# Patient Record
Sex: FEMALE | Race: WHITE | NOT HISPANIC OR LATINO | Employment: FULL TIME | URBAN - METROPOLITAN AREA
[De-identification: names, ages, dates, MRNs, and addresses within clinical notes are randomized per-mention and may not be internally consistent; named-entity substitution may affect disease eponyms.]

---

## 2021-04-20 ENCOUNTER — HOSPITAL ENCOUNTER (INPATIENT)
Facility: MEDICAL CENTER | Age: 36
LOS: 2 days | DRG: 915 | End: 2021-04-22
Attending: INTERNAL MEDICINE | Admitting: INTERNAL MEDICINE
Payer: COMMERCIAL

## 2021-04-20 ENCOUNTER — HOSPITAL ENCOUNTER (EMERGENCY)
Facility: MEDICAL CENTER | Age: 36
End: 2021-04-20
Attending: INTERNAL MEDICINE | Admitting: INTERNAL MEDICINE

## 2021-04-20 ENCOUNTER — APPOINTMENT (OUTPATIENT)
Dept: RADIOLOGY | Facility: MEDICAL CENTER | Age: 36
DRG: 915 | End: 2021-04-20
Attending: INTERNAL MEDICINE
Payer: COMMERCIAL

## 2021-04-20 PROBLEM — T78.2XXA ANAPHYLACTIC REACTION: Status: ACTIVE | Noted: 2021-04-20

## 2021-04-20 PROBLEM — J96.90 RESPIRATORY FAILURE REQUIRING INTUBATION (HCC): Status: ACTIVE | Noted: 2021-04-20

## 2021-04-20 LAB
ANION GAP SERPL CALC-SCNC: 6 MMOL/L (ref 7–16)
BASE EXCESS BLDA CALC-SCNC: -4 MMOL/L (ref -4–3)
BASOPHILS # BLD AUTO: 0.3 % (ref 0–1.8)
BASOPHILS # BLD: 0.02 K/UL (ref 0–0.12)
BODY TEMPERATURE: ABNORMAL DEGREES
BREATHS SETTING VENT: 16
BUN SERPL-MCNC: 6 MG/DL (ref 8–22)
CALCIUM SERPL-MCNC: 8.2 MG/DL (ref 8.5–10.5)
CHLORIDE SERPL-SCNC: 104 MMOL/L (ref 96–112)
CK SERPL-CCNC: 175 U/L (ref 0–154)
CO2 BLDA-SCNC: 21 MMOL/L (ref 20–33)
CO2 SERPL-SCNC: 21 MMOL/L (ref 20–33)
CREAT SERPL-MCNC: 0.68 MG/DL (ref 0.5–1.4)
DELSYS IDSYS: ABNORMAL
END TIDAL CARBON DIOXIDE IECO2: 34 MMHG
EOSINOPHIL # BLD AUTO: 0.01 K/UL (ref 0–0.51)
EOSINOPHIL NFR BLD: 0.1 % (ref 0–6.9)
ERYTHROCYTE [DISTWIDTH] IN BLOOD BY AUTOMATED COUNT: 43.8 FL (ref 35.9–50)
GLUCOSE SERPL-MCNC: 131 MG/DL (ref 65–99)
HCG SERPL QL: NEGATIVE
HCO3 BLDA-SCNC: 20.3 MMOL/L (ref 17–25)
HCT VFR BLD AUTO: 41.4 % (ref 37–47)
HGB BLD-MCNC: 13.2 G/DL (ref 12–16)
HOROWITZ INDEX BLDA+IHG-RTO: 387 MM[HG]
IMM GRANULOCYTES # BLD AUTO: 0.04 K/UL (ref 0–0.11)
IMM GRANULOCYTES NFR BLD AUTO: 0.6 % (ref 0–0.9)
LYMPHOCYTES # BLD AUTO: 0.32 K/UL (ref 1–4.8)
LYMPHOCYTES NFR BLD: 4.7 % (ref 22–41)
MAGNESIUM SERPL-MCNC: 1.6 MG/DL (ref 1.5–2.5)
MCH RBC QN AUTO: 28.2 PG (ref 27–33)
MCHC RBC AUTO-ENTMCNC: 31.9 G/DL (ref 33.6–35)
MCV RBC AUTO: 88.5 FL (ref 81.4–97.8)
MODE IMODE: ABNORMAL
MONOCYTES # BLD AUTO: 0.03 K/UL (ref 0–0.85)
MONOCYTES NFR BLD AUTO: 0.4 % (ref 0–13.4)
NEUTROPHILS # BLD AUTO: 6.33 K/UL (ref 2–7.15)
NEUTROPHILS NFR BLD: 93.9 % (ref 44–72)
NRBC # BLD AUTO: 0 K/UL
NRBC BLD-RTO: 0 /100 WBC
O2/TOTAL GAS SETTING VFR VENT: 30 %
PCO2 BLDA: 33.1 MMHG (ref 26–37)
PCO2 TEMP ADJ BLDA: 33.1 MMHG (ref 26–37)
PEEP END EXPIRATORY PRESSURE IPEEP: 8 CMH20
PH BLDA: 7.4 [PH] (ref 7.4–7.5)
PH TEMP ADJ BLDA: 7.4 [PH] (ref 7.4–7.5)
PHOSPHATE SERPL-MCNC: 1.5 MG/DL (ref 2.5–4.5)
PLATELET # BLD AUTO: 183 K/UL (ref 164–446)
PMV BLD AUTO: 10.8 FL (ref 9–12.9)
PO2 BLDA: 116 MMHG (ref 64–87)
PO2 TEMP ADJ BLDA: 116 MMHG (ref 64–87)
POTASSIUM SERPL-SCNC: 3.2 MMOL/L (ref 3.6–5.5)
RBC # BLD AUTO: 4.68 M/UL (ref 4.2–5.4)
SAO2 % BLDA: 99 % (ref 93–99)
SODIUM SERPL-SCNC: 131 MMOL/L (ref 135–145)
SPECIMEN DRAWN FROM PATIENT: ABNORMAL
TIDAL VOLUME IVT: 400 ML
TRIGL SERPL-MCNC: 42 MG/DL (ref 0–149)
WBC # BLD AUTO: 6.8 K/UL (ref 4.8–10.8)

## 2021-04-20 PROCEDURE — 84478 ASSAY OF TRIGLYCERIDES: CPT

## 2021-04-20 PROCEDURE — 84703 CHORIONIC GONADOTROPIN ASSAY: CPT

## 2021-04-20 PROCEDURE — 84100 ASSAY OF PHOSPHORUS: CPT

## 2021-04-20 PROCEDURE — 94760 N-INVAS EAR/PLS OXIMETRY 1: CPT

## 2021-04-20 PROCEDURE — 83735 ASSAY OF MAGNESIUM: CPT

## 2021-04-20 PROCEDURE — 82803 BLOOD GASES ANY COMBINATION: CPT

## 2021-04-20 PROCEDURE — 700111 HCHG RX REV CODE 636 W/ 250 OVERRIDE (IP): Performed by: INTERNAL MEDICINE

## 2021-04-20 PROCEDURE — 82550 ASSAY OF CK (CPK): CPT

## 2021-04-20 PROCEDURE — 36600 WITHDRAWAL OF ARTERIAL BLOOD: CPT

## 2021-04-20 PROCEDURE — 99291 CRITICAL CARE FIRST HOUR: CPT | Performed by: INTERNAL MEDICINE

## 2021-04-20 PROCEDURE — 5A1935Z RESPIRATORY VENTILATION, LESS THAN 24 CONSECUTIVE HOURS: ICD-10-PCS | Performed by: INTERNAL MEDICINE

## 2021-04-20 PROCEDURE — 94002 VENT MGMT INPAT INIT DAY: CPT

## 2021-04-20 PROCEDURE — 85025 COMPLETE CBC W/AUTO DIFF WBC: CPT

## 2021-04-20 PROCEDURE — 94799 UNLISTED PULMONARY SVC/PX: CPT

## 2021-04-20 PROCEDURE — 80048 BASIC METABOLIC PNL TOTAL CA: CPT

## 2021-04-20 PROCEDURE — 770022 HCHG ROOM/CARE - ICU (200)

## 2021-04-20 PROCEDURE — 700105 HCHG RX REV CODE 258: Performed by: INTERNAL MEDICINE

## 2021-04-20 PROCEDURE — 93005 ELECTROCARDIOGRAM TRACING: CPT | Performed by: INTERNAL MEDICINE

## 2021-04-20 PROCEDURE — 71045 X-RAY EXAM CHEST 1 VIEW: CPT

## 2021-04-20 RX ORDER — AMOXICILLIN 250 MG
2 CAPSULE ORAL 2 TIMES DAILY
Status: DISCONTINUED | OUTPATIENT
Start: 2021-04-21 | End: 2021-04-22 | Stop reason: HOSPADM

## 2021-04-20 RX ORDER — DEXTROSE MONOHYDRATE 25 G/50ML
50 INJECTION, SOLUTION INTRAVENOUS
Status: DISCONTINUED | OUTPATIENT
Start: 2021-04-20 | End: 2021-04-22 | Stop reason: HOSPADM

## 2021-04-20 RX ORDER — ONDANSETRON 4 MG/1
4 TABLET, ORALLY DISINTEGRATING ORAL EVERY 4 HOURS PRN
Status: DISCONTINUED | OUTPATIENT
Start: 2021-04-20 | End: 2021-04-22 | Stop reason: HOSPADM

## 2021-04-20 RX ORDER — ACETAMINOPHEN 325 MG/1
650 TABLET ORAL EVERY 6 HOURS PRN
Status: DISCONTINUED | OUTPATIENT
Start: 2021-04-20 | End: 2021-04-20

## 2021-04-20 RX ORDER — LABETALOL HYDROCHLORIDE 5 MG/ML
10 INJECTION, SOLUTION INTRAVENOUS EVERY 4 HOURS PRN
Status: DISCONTINUED | OUTPATIENT
Start: 2021-04-20 | End: 2021-04-22 | Stop reason: HOSPADM

## 2021-04-20 RX ORDER — ACETAMINOPHEN 325 MG/1
650 TABLET ORAL EVERY 6 HOURS PRN
Status: DISCONTINUED | OUTPATIENT
Start: 2021-04-20 | End: 2021-04-22 | Stop reason: HOSPADM

## 2021-04-20 RX ORDER — PROCHLORPERAZINE EDISYLATE 5 MG/ML
5-10 INJECTION INTRAMUSCULAR; INTRAVENOUS EVERY 4 HOURS PRN
Status: DISCONTINUED | OUTPATIENT
Start: 2021-04-20 | End: 2021-04-22 | Stop reason: HOSPADM

## 2021-04-20 RX ORDER — DEXAMETHASONE SODIUM PHOSPHATE 4 MG/ML
4 INJECTION, SOLUTION INTRA-ARTICULAR; INTRALESIONAL; INTRAMUSCULAR; INTRAVENOUS; SOFT TISSUE EVERY 6 HOURS
Status: COMPLETED | OUTPATIENT
Start: 2021-04-21 | End: 2021-04-21

## 2021-04-20 RX ORDER — PROMETHAZINE HYDROCHLORIDE 25 MG/1
12.5-25 SUPPOSITORY RECTAL EVERY 4 HOURS PRN
Status: DISCONTINUED | OUTPATIENT
Start: 2021-04-20 | End: 2021-04-22 | Stop reason: HOSPADM

## 2021-04-20 RX ORDER — SODIUM CHLORIDE 9 MG/ML
INJECTION, SOLUTION INTRAVENOUS CONTINUOUS
Status: DISCONTINUED | OUTPATIENT
Start: 2021-04-20 | End: 2021-04-21

## 2021-04-20 RX ORDER — BISACODYL 10 MG
10 SUPPOSITORY, RECTAL RECTAL
Status: DISCONTINUED | OUTPATIENT
Start: 2021-04-20 | End: 2021-04-22 | Stop reason: HOSPADM

## 2021-04-20 RX ORDER — METHYLPREDNISOLONE SODIUM SUCCINATE 40 MG/ML
40 INJECTION, POWDER, LYOPHILIZED, FOR SOLUTION INTRAMUSCULAR; INTRAVENOUS EVERY 6 HOURS
Status: DISCONTINUED | OUTPATIENT
Start: 2021-04-21 | End: 2021-04-20

## 2021-04-20 RX ORDER — ONDANSETRON 2 MG/ML
4 INJECTION INTRAMUSCULAR; INTRAVENOUS EVERY 4 HOURS PRN
Status: DISCONTINUED | OUTPATIENT
Start: 2021-04-20 | End: 2021-04-22 | Stop reason: HOSPADM

## 2021-04-20 RX ORDER — POLYETHYLENE GLYCOL 3350 17 G/17G
1 POWDER, FOR SOLUTION ORAL
Status: DISCONTINUED | OUTPATIENT
Start: 2021-04-20 | End: 2021-04-22 | Stop reason: HOSPADM

## 2021-04-20 RX ORDER — PROMETHAZINE HYDROCHLORIDE 25 MG/1
12.5-25 TABLET ORAL EVERY 4 HOURS PRN
Status: DISCONTINUED | OUTPATIENT
Start: 2021-04-20 | End: 2021-04-22 | Stop reason: HOSPADM

## 2021-04-20 RX ORDER — IPRATROPIUM BROMIDE AND ALBUTEROL SULFATE 2.5; .5 MG/3ML; MG/3ML
3 SOLUTION RESPIRATORY (INHALATION)
Status: DISCONTINUED | OUTPATIENT
Start: 2021-04-20 | End: 2021-04-22 | Stop reason: HOSPADM

## 2021-04-20 RX ADMIN — FAMOTIDINE 20 MG: 10 INJECTION INTRAVENOUS at 21:49

## 2021-04-20 RX ADMIN — SODIUM CHLORIDE: 9 INJECTION, SOLUTION INTRAVENOUS at 21:26

## 2021-04-20 RX ADMIN — FENTANYL CITRATE 50 MCG: 50 INJECTION, SOLUTION INTRAMUSCULAR; INTRAVENOUS at 22:00

## 2021-04-20 RX ADMIN — PROPOFOL 5 MCG/KG/MIN: 10 INJECTION, EMULSION INTRAVENOUS at 21:00

## 2021-04-20 RX ADMIN — PROPOFOL 20 MCG/KG/MIN: 10 INJECTION, EMULSION INTRAVENOUS at 20:50

## 2021-04-21 ENCOUNTER — HOSPITAL ENCOUNTER (OUTPATIENT)
Dept: RADIOLOGY | Facility: MEDICAL CENTER | Age: 36
End: 2021-04-21
Attending: INTERNAL MEDICINE
Payer: COMMERCIAL

## 2021-04-21 LAB
ANION GAP SERPL CALC-SCNC: 8 MMOL/L (ref 7–16)
BASOPHILS # BLD AUTO: 0.2 % (ref 0–1.8)
BASOPHILS # BLD: 0.01 K/UL (ref 0–0.12)
BUN SERPL-MCNC: 6 MG/DL (ref 8–22)
CALCIUM SERPL-MCNC: 8.6 MG/DL (ref 8.5–10.5)
CHLORIDE SERPL-SCNC: 105 MMOL/L (ref 96–112)
CO2 SERPL-SCNC: 21 MMOL/L (ref 20–33)
COMMENT 1642: NORMAL
CREAT SERPL-MCNC: 0.51 MG/DL (ref 0.5–1.4)
EKG IMPRESSION: NORMAL
EOSINOPHIL # BLD AUTO: 0.02 K/UL (ref 0–0.51)
EOSINOPHIL NFR BLD: 0.4 % (ref 0–6.9)
ERYTHROCYTE [DISTWIDTH] IN BLOOD BY AUTOMATED COUNT: 44 FL (ref 35.9–50)
GLUCOSE BLD-MCNC: 116 MG/DL (ref 65–99)
GLUCOSE BLD-MCNC: 137 MG/DL (ref 65–99)
GLUCOSE BLD-MCNC: 97 MG/DL (ref 65–99)
GLUCOSE SERPL-MCNC: 138 MG/DL (ref 65–99)
HCT VFR BLD AUTO: 42 % (ref 37–47)
HGB BLD-MCNC: 13.4 G/DL (ref 12–16)
IMM GRANULOCYTES # BLD AUTO: 0.02 K/UL (ref 0–0.11)
IMM GRANULOCYTES NFR BLD AUTO: 0.4 % (ref 0–0.9)
LYMPHOCYTES # BLD AUTO: 0.69 K/UL (ref 1–4.8)
LYMPHOCYTES NFR BLD: 14.2 % (ref 22–41)
MAGNESIUM SERPL-MCNC: 1.6 MG/DL (ref 1.5–2.5)
MCH RBC QN AUTO: 28.2 PG (ref 27–33)
MCHC RBC AUTO-ENTMCNC: 31.9 G/DL (ref 33.6–35)
MCV RBC AUTO: 88.4 FL (ref 81.4–97.8)
MONOCYTES # BLD AUTO: 0.12 K/UL (ref 0–0.85)
MONOCYTES NFR BLD AUTO: 2.5 % (ref 0–13.4)
MORPHOLOGY BLD-IMP: NORMAL
NEUTROPHILS # BLD AUTO: 4.01 K/UL (ref 2–7.15)
NEUTROPHILS NFR BLD: 82.3 % (ref 44–72)
NRBC # BLD AUTO: 0 K/UL
NRBC BLD-RTO: 0 /100 WBC
PHOSPHATE SERPL-MCNC: 2.7 MG/DL (ref 2.5–4.5)
PLATELET # BLD AUTO: 120 K/UL (ref 164–446)
PMV BLD AUTO: 12.2 FL (ref 9–12.9)
POTASSIUM SERPL-SCNC: 3.6 MMOL/L (ref 3.6–5.5)
RBC # BLD AUTO: 4.75 M/UL (ref 4.2–5.4)
SODIUM SERPL-SCNC: 134 MMOL/L (ref 135–145)
WBC # BLD AUTO: 4.9 K/UL (ref 4.8–10.8)

## 2021-04-21 PROCEDURE — 85025 COMPLETE CBC W/AUTO DIFF WBC: CPT

## 2021-04-21 PROCEDURE — 71045 X-RAY EXAM CHEST 1 VIEW: CPT

## 2021-04-21 PROCEDURE — 700105 HCHG RX REV CODE 258: Performed by: INTERNAL MEDICINE

## 2021-04-21 PROCEDURE — 93010 ELECTROCARDIOGRAM REPORT: CPT | Performed by: INTERNAL MEDICINE

## 2021-04-21 PROCEDURE — 80048 BASIC METABOLIC PNL TOTAL CA: CPT

## 2021-04-21 PROCEDURE — A9270 NON-COVERED ITEM OR SERVICE: HCPCS | Performed by: INTERNAL MEDICINE

## 2021-04-21 PROCEDURE — 700101 HCHG RX REV CODE 250: Performed by: STUDENT IN AN ORGANIZED HEALTH CARE EDUCATION/TRAINING PROGRAM

## 2021-04-21 PROCEDURE — 770001 HCHG ROOM/CARE - MED/SURG/GYN PRIV*

## 2021-04-21 PROCEDURE — 700111 HCHG RX REV CODE 636 W/ 250 OVERRIDE (IP): Performed by: INTERNAL MEDICINE

## 2021-04-21 PROCEDURE — 94003 VENT MGMT INPAT SUBQ DAY: CPT

## 2021-04-21 PROCEDURE — 700111 HCHG RX REV CODE 636 W/ 250 OVERRIDE (IP): Performed by: STUDENT IN AN ORGANIZED HEALTH CARE EDUCATION/TRAINING PROGRAM

## 2021-04-21 PROCEDURE — 700102 HCHG RX REV CODE 250 W/ 637 OVERRIDE(OP): Performed by: INTERNAL MEDICINE

## 2021-04-21 PROCEDURE — 94150 VITAL CAPACITY TEST: CPT

## 2021-04-21 PROCEDURE — 82962 GLUCOSE BLOOD TEST: CPT

## 2021-04-21 PROCEDURE — 99223 1ST HOSP IP/OBS HIGH 75: CPT | Performed by: STUDENT IN AN ORGANIZED HEALTH CARE EDUCATION/TRAINING PROGRAM

## 2021-04-21 PROCEDURE — 99291 CRITICAL CARE FIRST HOUR: CPT | Performed by: INTERNAL MEDICINE

## 2021-04-21 PROCEDURE — 83735 ASSAY OF MAGNESIUM: CPT

## 2021-04-21 PROCEDURE — 84100 ASSAY OF PHOSPHORUS: CPT

## 2021-04-21 PROCEDURE — 700105 HCHG RX REV CODE 258: Performed by: STUDENT IN AN ORGANIZED HEALTH CARE EDUCATION/TRAINING PROGRAM

## 2021-04-21 PROCEDURE — 94799 UNLISTED PULMONARY SVC/PX: CPT

## 2021-04-21 RX ORDER — ALPRAZOLAM 0.5 MG/1
0.5 TABLET ORAL 2 TIMES DAILY PRN
Status: DISCONTINUED | OUTPATIENT
Start: 2021-04-21 | End: 2021-04-22 | Stop reason: HOSPADM

## 2021-04-21 RX ORDER — POTASSIUM CHLORIDE 20 MEQ/1
60 TABLET, EXTENDED RELEASE ORAL ONCE
Status: COMPLETED | OUTPATIENT
Start: 2021-04-21 | End: 2021-04-21

## 2021-04-21 RX ORDER — POTASSIUM CHLORIDE 7.45 MG/ML
10 INJECTION INTRAVENOUS
Status: DISCONTINUED | OUTPATIENT
Start: 2021-04-21 | End: 2021-04-21 | Stop reason: DRUGHIGH

## 2021-04-21 RX ORDER — MAGNESIUM SULFATE HEPTAHYDRATE 40 MG/ML
2 INJECTION, SOLUTION INTRAVENOUS ONCE
Status: COMPLETED | OUTPATIENT
Start: 2021-04-21 | End: 2021-04-21

## 2021-04-21 RX ORDER — POTASSIUM CHLORIDE 7.45 MG/ML
10 INJECTION INTRAVENOUS
Status: DISPENSED | OUTPATIENT
Start: 2021-04-21 | End: 2021-04-21

## 2021-04-21 RX ORDER — OXYCODONE HYDROCHLORIDE 5 MG/1
5-10 TABLET ORAL EVERY 4 HOURS PRN
Status: DISCONTINUED | OUTPATIENT
Start: 2021-04-21 | End: 2021-04-22 | Stop reason: HOSPADM

## 2021-04-21 RX ORDER — ALBUTEROL SULFATE 90 UG/1
2 AEROSOL, METERED RESPIRATORY (INHALATION)
Status: DISCONTINUED | OUTPATIENT
Start: 2021-04-21 | End: 2021-04-21

## 2021-04-21 RX ORDER — MIDAZOLAM HYDROCHLORIDE 1 MG/ML
1 INJECTION INTRAMUSCULAR; INTRAVENOUS EVERY 4 HOURS
Status: DISCONTINUED | OUTPATIENT
Start: 2021-04-21 | End: 2021-04-21

## 2021-04-21 RX ORDER — MIDAZOLAM HYDROCHLORIDE 1 MG/ML
1-2 INJECTION INTRAMUSCULAR; INTRAVENOUS
Status: DISCONTINUED | OUTPATIENT
Start: 2021-04-21 | End: 2021-04-21

## 2021-04-21 RX ORDER — HYDROMORPHONE HYDROCHLORIDE 1 MG/ML
0.5 INJECTION, SOLUTION INTRAMUSCULAR; INTRAVENOUS; SUBCUTANEOUS ONCE
Status: COMPLETED | OUTPATIENT
Start: 2021-04-21 | End: 2021-04-21

## 2021-04-21 RX ADMIN — MAGNESIUM SULFATE 2 G: 2 INJECTION INTRAVENOUS at 08:11

## 2021-04-21 RX ADMIN — FENTANYL CITRATE 100 MCG: 50 INJECTION, SOLUTION INTRAMUSCULAR; INTRAVENOUS at 03:00

## 2021-04-21 RX ADMIN — OXYCODONE HYDROCHLORIDE 10 MG: 5 TABLET ORAL at 23:00

## 2021-04-21 RX ADMIN — DEXAMETHASONE SODIUM PHOSPHATE 4 MG: 4 INJECTION, SOLUTION INTRA-ARTICULAR; INTRALESIONAL; INTRAMUSCULAR; INTRAVENOUS; SOFT TISSUE at 00:33

## 2021-04-21 RX ADMIN — HYDROMORPHONE HYDROCHLORIDE 0.5 MG: 1 INJECTION, SOLUTION INTRAMUSCULAR; INTRAVENOUS; SUBCUTANEOUS at 23:00

## 2021-04-21 RX ADMIN — POTASSIUM CHLORIDE 60 MEQ: 1500 TABLET, EXTENDED RELEASE ORAL at 12:50

## 2021-04-21 RX ADMIN — DEXAMETHASONE SODIUM PHOSPHATE 4 MG: 4 INJECTION, SOLUTION INTRA-ARTICULAR; INTRALESIONAL; INTRAMUSCULAR; INTRAVENOUS; SOFT TISSUE at 06:10

## 2021-04-21 RX ADMIN — ALPRAZOLAM 0.5 MG: 0.5 TABLET ORAL at 18:18

## 2021-04-21 RX ADMIN — MIDAZOLAM HYDROCHLORIDE 2 MG: 1 INJECTION, SOLUTION INTRAMUSCULAR; INTRAVENOUS at 09:39

## 2021-04-21 RX ADMIN — POTASSIUM CHLORIDE 10 MEQ: 7.46 INJECTION, SOLUTION INTRAVENOUS at 08:12

## 2021-04-21 RX ADMIN — FAMOTIDINE 20 MG: 10 INJECTION INTRAVENOUS at 06:09

## 2021-04-21 RX ADMIN — MIDAZOLAM HYDROCHLORIDE 2 MG: 1 INJECTION, SOLUTION INTRAMUSCULAR; INTRAVENOUS at 12:51

## 2021-04-21 RX ADMIN — DEXAMETHASONE SODIUM PHOSPHATE 4 MG: 4 INJECTION, SOLUTION INTRA-ARTICULAR; INTRALESIONAL; INTRAMUSCULAR; INTRAVENOUS; SOFT TISSUE at 12:51

## 2021-04-21 RX ADMIN — FENTANYL CITRATE 100 MCG: 50 INJECTION, SOLUTION INTRAMUSCULAR; INTRAVENOUS at 06:30

## 2021-04-21 RX ADMIN — PROPOFOL 35 MCG/KG/MIN: 10 INJECTION, EMULSION INTRAVENOUS at 04:06

## 2021-04-21 RX ADMIN — SODIUM CHLORIDE: 9 INJECTION, SOLUTION INTRAVENOUS at 07:42

## 2021-04-21 RX ADMIN — FENTANYL CITRATE 100 MCG: 50 INJECTION, SOLUTION INTRAMUSCULAR; INTRAVENOUS at 08:12

## 2021-04-21 RX ADMIN — ENOXAPARIN SODIUM 40 MG: 40 INJECTION SUBCUTANEOUS at 06:10

## 2021-04-21 RX ADMIN — POTASSIUM PHOSPHATE, MONOBASIC AND POTASSIUM PHOSPHATE, DIBASIC 15 MMOL: 224; 236 INJECTION, SOLUTION, CONCENTRATE INTRAVENOUS at 03:12

## 2021-04-21 RX ADMIN — OXYCODONE HYDROCHLORIDE 10 MG: 5 TABLET ORAL at 18:43

## 2021-04-21 ASSESSMENT — PAIN DESCRIPTION - PAIN TYPE
TYPE: ACUTE PAIN
TYPE: ACUTE PAIN
TYPE: ACUTE PAIN;CHRONIC PAIN
TYPE: ACUTE PAIN
TYPE: ACUTE PAIN;CHRONIC PAIN
TYPE: ACUTE PAIN
TYPE: ACUTE PAIN;CHRONIC PAIN
TYPE: ACUTE PAIN

## 2021-04-21 ASSESSMENT — COGNITIVE AND FUNCTIONAL STATUS - GENERAL
MOBILITY SCORE: 22
DAILY ACTIVITIY SCORE: 24
SUGGESTED CMS G CODE MODIFIER MOBILITY: CJ
WALKING IN HOSPITAL ROOM: A LITTLE
CLIMB 3 TO 5 STEPS WITH RAILING: A LITTLE
SUGGESTED CMS G CODE MODIFIER DAILY ACTIVITY: CH

## 2021-04-21 ASSESSMENT — COPD QUESTIONNAIRES
DURING THE PAST 4 WEEKS HOW MUCH DID YOU FEEL SHORT OF BREATH: NONE/LITTLE OF THE TIME
COPD SCREENING SCORE: 0
DO YOU EVER COUGH UP ANY MUCUS OR PHLEGM?: NO/ONLY WITH OCCASIONAL COLDS OR INFECTIONS
HAVE YOU SMOKED AT LEAST 100 CIGARETTES IN YOUR ENTIRE LIFE: NO/DON'T KNOW

## 2021-04-21 ASSESSMENT — PULMONARY FUNCTION TESTS: FVC: 3.7

## 2021-04-21 ASSESSMENT — LIFESTYLE VARIABLES: EVER_SMOKED: NEVER

## 2021-04-21 NOTE — CARE PLAN
Problem: Pain Management  Goal: Pain level will decrease to patient's comfort goal  Outcome: PROGRESSING AS EXPECTED     Problem: Respiratory:  Goal: Respiratory status will improve  Outcome: PROGRESSING AS EXPECTED  Intervention: Administer and titrate oxygen therapy  Note: Sponting on ventilator, awaiting parameters to extubate.      Problem: Safety - Medical Restraint  Goal: Remains free of injury from restraints (Restraint for Interference with Medical Device)  Description: INTERVENTIONS:  1. Determine that other, less restrictive measures have been tried or would not be effective before applying the restraint  2. Evaluate the patient's condition at the time of restraint application  3. Inform patient/family regarding the reason for restraint  4. Q2H: Monitor safety, psychosocial status, comfort, nutrition and hydration  Outcome: PROGRESSING SLOWER THAN EXPECTED  Flowsheets (Taken 4/20/2021 1943 by Lilia Meléndez, R.N.)  Addressed this shift: Remains free of injury from restraints (restraint for interference with medical device):  • Evaluate the patient's condition at the time of restraint application  • Determine that other, less restrictive measures have been tried or would not be effective before applying the restraint  • Inform patient/family regarding the reason for restraint  • Every 2 hours: Monitor safety, psychosocial status, comfort, nutrition and hydration  Goal: Free from restraint(s) (Restraint for Interference with Medical Device)  Description: INTERVENTIONS:  1. ONCE/SHIFT or MINIMUM Q12H: Assess and document the continuing need for restraints  2. Q24H: Continued use of restraint requires LIP to perform face to face examination and written order  3. Identify and implement measures to help patient regain control  Outcome: PROGRESSING SLOWER THAN EXPECTED  Flowsheets (Taken 4/20/2021 1943 by Lilia Meléndez, R.N.)  Addressed this shift: Free from restraint(s) (restraint for interference with  medical device):  • ONCE/SHIFT or MINIMUM Every 12 hours: Assess and document the continuing need for restraints  • Every 24 hours: Continued use of restraint requires Licensed Independent Practitioner to perform face to face examination and written order  • Identify and implement measures to help patient regain control

## 2021-04-21 NOTE — PROGRESS NOTES
Patient arrived at 1950 by [facility] ([employer]). On Versed and Ketamine gtts, intubated. Received report from med flight nurse. Increase turbulence on they way over, increase sedation required. Aferbile. BP stable see flowsheets. O2 sats 100% on 30% FiO2.     100ml of ketamine infusion left- used 70ml until new sedation orders. Wasted with TATUM Perez.  Fent gtt almost dry- discarded remainder.     MD Marley made aware of pts arrival.  New soriano placed- 100 out in old soriano.    2 RN skin check performed.

## 2021-04-21 NOTE — DISCHARGE PLANNING
Care Transition Team Discharge Planning    Anticipated Discharge Disposition: TBD    Action: Per AM rounds, pt will most likely be extubated today. Pt has anxiety. Pt is on an oxy mask. Pt is able to follow directions, and moves all extremities.     Barriers to Discharge: medical clearance/stability    Plan: Lsw will continue to follow, attend rounds for medical updates, and assist w/ d/c planning.

## 2021-04-21 NOTE — CARE PLAN
Ventilator Daily Summary    Vent Day #2  7.5 @ 24    %, +8, 30%    Ventilator settings changed this shift: Initial settings adjusted.    Weaning trials:    Respiratory Procedures:    Plan: Continue current ventilator settings and wean mechanical ventilation as tolerated per physician orders.

## 2021-04-21 NOTE — ASSESSMENT & PLAN NOTE
S/p intubation for respiratory failure  Under ICU care  IV fluid  Continue Decadron 4 mg every 6 hours  Famotidine 20 mg every 8 hours

## 2021-04-21 NOTE — PROGRESS NOTES
Critical Care Progress Note    Date of admission  4/20/2021    Chief Complaint  This is a 35-year-old female who apparently was at [facility] with some friends.  She reportedly has a dairy allergy and ate a yogurt covered pretzel.  She immediately began having anaphylactic symptoms with diffuse red rash and respiratory distress.  On EMS arrival they found her in marked respiratory distress with minimal air movement.  She was given oxygen and I believe some bag mask assist to the ED at [facility].  There she was immediately given IV and IM epinephrine in the ambulance bay and then brought into the ER room and intubated.  ERP describes significantly edematous vocal folds nearly completely swollen shut but was able to be intubated on the first attempt.  She had some mild inferior ST depression with no elevation.  She was given IV Solu-Medrol, Benadryl and further work-up for Covid and labs were pending at the time of call.  She was not hypotensive and was not requiring any further epinephrine.  Diffuse bright red rash was improving at the time of transfer call.  EMS did [place] have any [place] are attempting to get more contact information.    Hospital Course  No notes on file    Interval Problem Update  Reviewed last 24 hour events:  On mechanical ventilator  Propofol and fentanyl drips for sedation  Dexamethasone, on pepcid  cxray clear    Addendum  Patient extubated tolerated well on reassessment    Addendum  By afternoon patient stable for transfer to medical, discussed with hospitalist and orders placed.    Review of Systems  Review of Systems   Unable to perform ROS: Intubated        Vital Signs for last 24 hours   Temp:  [36.7 °C (98 °F)-37 °C (98.6 °F)] 36.8 °C (98.3 °F)  Pulse:  [62-89] 79  Resp:  [10-22] 10  BP: (100-126)/(63-85) 121/82  SpO2:  [97 %-100 %] 100 %    Hemodynamic parameters for last 24 hours        Respiratory Information for the last 24 hours  Vent Mode: ASV  Rate (breaths/min): 16  Vt Target (mL): 400  PEEP/CPAP: 8  MAP: 11  Length of Weaning Trial (Hours): 1  Control VTE (exp VT): 1732    Physical Exam   Physical Exam  Vitals and nursing note reviewed.   Constitutional:       General: She is not in acute distress.     Appearance: She is not ill-appearing, toxic-appearing or diaphoretic.   HENT:      Head: Normocephalic and atraumatic.      Nose: No congestion or rhinorrhea.      Mouth/Throat:      Mouth: Mucous membranes are dry.      Pharynx: No oropharyngeal exudate or posterior oropharyngeal erythema.   Eyes:      General: No scleral icterus.     Extraocular Movements: Extraocular movements intact.      Conjunctiva/sclera: Conjunctivae normal.      Pupils: Pupils are equal, round, and reactive to light.   Cardiovascular:      Rate and Rhythm: Regular rhythm. Tachycardia present.      Pulses: Normal pulses.      Heart sounds: Normal heart sounds. No murmur.   Pulmonary:      Effort: Respiratory distress present.      Breath sounds: No wheezing.   Abdominal:      General: There is no distension.      Palpations: There is no mass.      Tenderness: There is no abdominal tenderness. There is no guarding.   Musculoskeletal:         General: No swelling or tenderness.      Cervical back: Neck supple. No rigidity. No muscular tenderness.      Right lower leg: No edema.      Left lower leg: No edema.   Lymphadenopathy:      Cervical: No cervical adenopathy.   Skin:     Coloration: Skin is not jaundiced or pale.      Findings: No bruising, erythema, lesion or rash.   Neurological:      General: No focal deficit present.      Mental Status: She is alert and oriented to person, place, and time.      Cranial Nerves: No cranial nerve deficit.      Sensory: No sensory deficit.      Motor: No weakness.      Coordination: Coordination normal.      Deep Tendon Reflexes: Reflexes normal.         Medications  Current  Facility-Administered Medications   Medication Dose Route Frequency Provider Last Rate Last Admin   • potassium chloride (KCL) ivpb 10 mEq  10 mEq Intravenous Q HOUR Morgan Nunes M.D.   Stopped at 04/21/21 0912   • magnesium sulfate IVPB premix 2 g  2 g Intravenous Once Morgan Nunes M.D. 25 mL/hr at 04/21/21 0811 2 g at 04/21/21 0811   • Respiratory Therapy Consult   Nebulization Continuous RT Davey Marley M.D.       • senna-docusate (PERICOLACE or SENOKOT S) 8.6-50 MG per tablet 2 tablet  2 tablet Enteral Tube BID Davey Marley M.D.   Stopped at 04/21/21 0600    And   • polyethylene glycol/lytes (MIRALAX) PACKET 1 Packet  1 Packet Enteral Tube QDAY PRN Davey Marley M.D.        And   • magnesium hydroxide (MILK OF MAGNESIA) suspension 30 mL  30 mL Enteral Tube QDAY PRN Davey Marley M.D.        And   • bisacodyl (DULCOLAX) suppository 10 mg  10 mg Rectal QDAY PRN Davey Marley M.D.       • MD Alert...ICU Electrolyte Replacement per Pharmacy   Other PHARMACY TO DOSE Davey Marley M.D.       • lidocaine (XYLOCAINE) 1 % injection 1-2 mL  1-2 mL Tracheal Tube Q30 MIN PRN Davey Marley M.D.       • NS infusion   Intravenous Continuous Davey Marley M.D. 100 mL/hr at 04/21/21 0742 New Bag at 04/21/21 0742   • propofol (DIPRIVAN) injection  0-80 mcg/kg/min Intravenous Continuous Davey Marley M.D.   Stopped at 04/21/21 0545   • fentaNYL (SUBLIMAZE) injection 25 mcg  25 mcg Intravenous Q HOUR PRN Davey Marley M.D.        Or   • fentaNYL (SUBLIMAZE) injection 50 mcg  50 mcg Intravenous Q HOUR PRN Davey Marley M.D.   50 mcg at 04/20/21 2200    Or   • fentaNYL (SUBLIMAZE) injection 100 mcg  100 mcg Intravenous Q HOUR PRN Davey Marley M.D.   100 mcg at 04/21/21 0812   • ipratropium-albuterol (DUONEB) nebulizer solution  3 mL Nebulization Q2HRS PRN (RT) Davey Marley M.D.       • famotidine (PEPCID) injection 20 mg  20 mg Intravenous Q8HRS Davey Marley M.D.   20 mg at 04/21/21 0609   •  insulin regular (HumuLIN R,NovoLIN R) injection  2-9 Units Subcutaneous Q6HRS Davey Marley M.D.   Stopped at 04/21/21 0000    And   • dextrose 50% (D50W) injection 50 mL  50 mL Intravenous Q15 MIN PRN Davey Marley M.D.       • dexamethasone (DECADRON) injection 4 mg  4 mg Intravenous Q6HRS Davey Marley M.D.   4 mg at 04/21/21 0610   • enoxaparin (LOVENOX) inj 40 mg  40 mg Subcutaneous DAILY Davey Marley M.D.   40 mg at 04/21/21 0610   • labetalol (NORMODYNE/TRANDATE) injection 10 mg  10 mg Intravenous Q4HRS PRN Davey Marley M.D.       • ondansetron (ZOFRAN) syringe/vial injection 4 mg  4 mg Intravenous Q4HRS PRN Davey Marley M.D.       • ondansetron (ZOFRAN ODT) dispertab 4 mg  4 mg Enteral Tube Q4HRS PRN Davey Marley M.D.       • promethazine (PHENERGAN) tablet 12.5-25 mg  12.5-25 mg Enteral Tube Q4HRS PRN Davey Marley M.D.       • promethazine (PHENERGAN) suppository 12.5-25 mg  12.5-25 mg Rectal Q4HRS PRN Davey Marley M.D.       • prochlorperazine (COMPAZINE) injection 5-10 mg  5-10 mg Intravenous Q4HRS PRN Davey Marley M.D.       • acetaminophen (Tylenol) tablet 650 mg  650 mg Enteral Tube Q6HRS PRN Davey Marley M.D.           Fluids    Intake/Output Summary (Last 24 hours) at 4/21/2021 0840  Last data filed at 4/21/2021 0600  Gross per 24 hour   Intake --   Output 1200 ml   Net -1200 ml       Laboratory  Recent Labs     04/20/21  2106   ISTATAPH 7.397*   ISTATAPCO2 33.1   ISTATAPO2 116*   ISTATATCO2 21   TGVLMZI4CIZ 99   ISTATARTHCO3 20.3   ISTATARTBE -4   ISTATTEMP 98.6 F   ISTATFIO2 30   ISTATSPEC Arterial   ISTATAPHTC 7.397*   FTMNXESS4ZG 116*     Recent Labs     04/20/21 2135   CPKTOTAL 175*     Recent Labs     04/20/21 2135 04/21/21  0630   SODIUM 131* 134*   POTASSIUM 3.2* 3.6   CHLORIDE 104 105   CO2 21 21   BUN 6* 6*   CREATININE 0.68 0.51   MAGNESIUM 1.6 1.6   PHOSPHORUS 1.5* 2.7   CALCIUM 8.2* 8.6     Recent Labs     04/20/21 2135 04/21/21  0630   GLUCOSE 131* 138*      Recent Labs     04/20/21 2135 04/21/21  0630   WBC 6.8 4.9   NEUTSPOLYS 93.90* 82.30*   LYMPHOCYTES 4.70* 14.20*   MONOCYTES 0.40 2.50   EOSINOPHILS 0.10 0.40   BASOPHILS 0.30 0.20     Recent Labs     04/20/21 2135 04/21/21  0630   RBC 4.68 4.75   HEMOGLOBIN 13.2 13.4   HEMATOCRIT 41.4 42.0   PLATELETCT 183 120*       Imaging  X-Ray:  I have personally reviewed the images and compared with prior images.  EKG:  I have personally reviewed the images and compared with prior images.    Assessment/Plan  * Anaphylactic reaction  Assessment & Plan  History of dairy allergy and apparently she ate a yogurt covered rectal with immediate anaphylactic reaction and upper airway obstruction, rash  Treated with epinephrine, fluid resuscitation, Solu-Medrol, Benadryl, Pepcid  Rash nearly resolved with good air entry bilaterally  Currently normotensive, will continue IV fluid, further epinephrine if hypotensive but she is likely past the acute phase at this point  She will need instructions regarding anaphylaxis, EpiPen, etc. prior to discharge  If cuff leak present ok to extubate    Respiratory failure requiring intubation (HCC)  Assessment & Plan  Emergently intubated for anaphylaxis with upper airway obstruction  Continue full ventilator support, A-F bundle  SAT/SBT in a.m., assess for cuff leak, extubate as clinically appropriate       VTE:  Lovenox  Ulcer: H2 Antagonist  Lines: None    I have performed a physical exam and reviewed and updated ROS and Plan today (4/21/2021). In review of yesterday's note (4/20/2021), there are no changes except as documented above.     Discussed patient condition and risk of morbidity and/or mortality with RN, RT, Pharmacy, Code status disscussed and Patient     The patient remains critically ill.  On mechanical ventilator.  Propofol drip for sedation.  SBT and SAT, extubate if meets parameters.  Critical care time = 37 minutes in directly providing and coordinating critical care and  extensive data review.  No time overlap and excludes procedures.

## 2021-04-21 NOTE — PROGRESS NOTES
Family friend Luiz who was with patient at time of anaphylaxis called for updates. She has pts 3 yr old daughter, concerned with 8.5 hour drive at discharge. Will follow-up.    Updated chart and social work consulted.     Luiz Zuniga  [phone]

## 2021-04-21 NOTE — H&P
Hospital Medicine History & Physical Note    Date of Service  4/21/2021    Primary Care Physician  No primary care provider on file.    Consultants  Medical ICU    Code Status  Full Code    Chief Complaint  Anaphylaxis    History of Presenting Illness  35 y.o. female who presented 4/20/2021 with anaphylaxis.  Patient has a dairy allergy and ate a yogurt covered pretzel today.  upon arrival, she is found to have respiratory distress.  She was given oxygen and transferred to [facility] to which she was administered IV and IM epinephrine and subsequently intubated after.  Patient was given IV Solu-Medrol Benadryl and was transferred to [facility] for further care.     Review of Systems  ROS  History limited as patient intubated and sedated    Past Medical History  No pertinent medical history    Surgical History  No pertinent surgical history    Family History  No pertinent family history    Social History    Unable to obtain at this time due to acuity of condition    Allergies  Allergies   Allergen Reactions   • Dairy Food Allergy Anaphylaxis       Medications  None       Physical Exam  Temp:  [37 °C (98.6 °F)] 37 °C (98.6 °F)  Pulse:  [80-89] 84  Resp:  [15-21] 15  BP: (103)/(71) 103/71  SpO2:  [99 %-100 %] 100 %    Physical Exam  Constitutional:       Appearance: Normal appearance.      Comments: Sedated, intubated   HENT:      Head: Normocephalic and atraumatic.      Comments: Diffusely swollen eyelids and cheeks     Nose: Nose normal.      Mouth/Throat:      Mouth: Mucous membranes are moist.      Comments: ETT tube in place  Cardiovascular:      Rate and Rhythm: Normal rate and regular rhythm.      Pulses: Normal pulses.   Pulmonary:      Comments: Coarse breath sounds heard  Decreased air entry heard diffusely  Abdominal:      General: Abdomen is flat.      Palpations: Abdomen is soft.   Musculoskeletal:         General: Normal range of motion.      Cervical back: Normal range of motion.   Skin:      General: Skin is warm.   Neurological:      Comments: Sedated           Laboratory:  Recent Labs     04/20/21 2135   WBC 6.8   RBC 4.68   HEMOGLOBIN 13.2   HEMATOCRIT 41.4   MCV 88.5   MCH 28.2   MCHC 31.9*   RDW 43.8   PLATELETCT 183   MPV 10.8     Recent Labs     04/20/21 2135   SODIUM 131*   POTASSIUM 3.2*   CHLORIDE 104   CO2 21   GLUCOSE 131*   BUN 6*   CREATININE 0.68   CALCIUM 8.2*     Recent Labs     04/20/21 2135   GLUCOSE 131*         No results for input(s): NTPROBNP in the last 72 hours.  Recent Labs     04/20/21 2135   TRIGLYCERIDE 42     No results for input(s): TROPONINT in the last 72 hours.    Imaging:  DX-CHEST-PORTABLE (1 VIEW)   Final Result      No acute cardiopulmonary abnormality.      Satisfactory appearance of endotracheal tube.      DX-CHEST-PORTABLE (1 VIEW)    (Results Pending)         Assessment/Plan:  I anticipate this patient will require at least two midnights for appropriate medical management, necessitating inpatient admission.    * Anaphylactic reaction  Assessment & Plan  S/p intubation for respiratory failure  Under ICU care  IV fluid  Continue Decadron 4 mg every 6 hours  Famotidine 20 mg every 8 hours      Respiratory failure requiring intubation (HCC)  Assessment & Plan  Ventilation management  On propofol, CPAP trial in a.m.  Serial ABGs  Duo nebulizer as needed

## 2021-04-21 NOTE — CARE PLAN
Problem: Communication  Goal: The ability to communicate needs accurately and effectively will improve  Outcome: PROGRESSING AS EXPECTED     Problem: Safety  Goal: Will remain free from injury  Outcome: PROGRESSING AS EXPECTED  Goal: Will remain free from falls  Outcome: PROGRESSING AS EXPECTED     Problem: Infection  Goal: Will remain free from infection  Outcome: PROGRESSING AS EXPECTED     Problem: Venous Thromboembolism (VTW)/Deep Vein Thrombosis (DVT) Prevention:  Goal: Patient will participate in Venous Thrombosis (VTE)/Deep Vein Thrombosis (DVT)Prevention Measures  Outcome: PROGRESSING AS EXPECTED     Problem: Bowel/Gastric:  Goal: Normal bowel function is maintained or improved  Outcome: PROGRESSING AS EXPECTED  Goal: Will not experience complications related to bowel motility  Outcome: PROGRESSING AS EXPECTED     Problem: Knowledge Deficit  Goal: Knowledge of disease process/condition, treatment plan, diagnostic tests, and medications will improve  Outcome: PROGRESSING AS EXPECTED  Goal: Knowledge of the prescribed therapeutic regimen will improve  Outcome: PROGRESSING AS EXPECTED     Problem: Discharge Barriers/Planning  Goal: Patient's continuum of care needs will be met  Outcome: PROGRESSING AS EXPECTED     Problem: Pain Management  Goal: Pain level will decrease to patient's comfort goal  Outcome: PROGRESSING AS EXPECTED     Problem: Respiratory:  Goal: Respiratory status will improve  Outcome: PROGRESSING AS EXPECTED     Problem: Fluid Volume:  Goal: Will maintain balanced intake and output  Outcome: PROGRESSING AS EXPECTED     Problem: Skin Integrity  Goal: Risk for impaired skin integrity will decrease  Outcome: PROGRESSING AS EXPECTED     Problem: Psychosocial Needs:  Goal: Level of anxiety will decrease  Outcome: PROGRESSING SLOWER THAN EXPECTED

## 2021-04-21 NOTE — CONSULTS
Critical Care Consultation    Date of consult: 4/20/2021    Referring Physician  Davey Marley M.D.    Reason for Consultation  Anaphylaxis requiring emergent intubation at outside hospital    History of Presenting Illness  This is a 35-year-old female who apparently was at [facility] in [facility] with some friends.  She reportedly has a dairy allergy and ate a yogurt covered pretzel.  She immediately began having anaphylactic symptoms with diffuse red rash and respiratory distress.  On EMS arrival they found her in marked respiratory distress with minimal air movement.  She was given oxygen and I believe some bag mask assist to the ED at [facility].  There she was immediately given IV and IM epinephrine in the ambulance bay and then brought into the ER room and intubated.  ERP describes significantly edematous vocal folds nearly completely swollen shut but was able to be intubated on the first attempt.  She had some mild inferior ST depression with no elevation.  She was given IV Solu-Medrol, Benadryl and further work-up for Covid and labs were pending at the time of call.  She was not hypotensive and was not requiring any further epinephrine.  Diffuse bright red rash was improving at the time of transfer call.  EMS [place]  [place] are [place] to get more contact information.    Code Status  Full Code    Review of Systems  Review of Systems   Unable to perform ROS: Intubated       Past Medical History   has no past medical history on file.    Surgical History   has no past surgical history on file.    Family History  family history is not on file.    Social History       Medications  Home Medications    **Home medications have not yet been reviewed for this encounter**       No current facility-administered medications for this encounter.       Allergies  Allergies   Allergen Reactions   • Dairy Food Allergy Anaphylaxis       Dairy!  Otherwise unknown    Vital Signs last 24 hours       Physical Exam  Physical Exam  Vitals and nursing note reviewed.   Constitutional:       Comments: Sedated, currently not following   HENT:      Head: Normocephalic and atraumatic.      Nose: Nose normal.      Mouth/Throat:      Mouth: Mucous membranes are moist.   Eyes:      Pupils: Pupils are equal, round, and reactive to light.      Comments: Eyelids not completely closed, possible postsurgical changes   Cardiovascular:      Rate and Rhythm: Normal rate and regular rhythm.   Pulmonary:      Comments: ETT in place, full ventilator support, diminished breath sounds, no wheeze  Abdominal:      General: There is no distension.      Palpations: Abdomen is soft.   Musculoskeletal:         General: No swelling or deformity.      Cervical back: Normal range of motion and neck supple.   Skin:     General: Skin is warm and dry.      Capillary Refill: Capillary refill takes less than 2 seconds.      Comments: Faint red rash/hyperemia over chest   Neurological:      Comments: Sedated, not following commands, weakly withdraws, on sedation         Fluids    Intake/Output Summary (Last 24 hours) at 4/20/2021 9092  Last data filed at 4/20/2021 2000  Gross per 24 hour   Intake --   Output 100 ml   Net -100 ml       Laboratory  Recent Results (from the past 48 hour(s))   POCT arterial blood gas device results    Collection Time: 04/20/21  9:06 PM   Result Value Ref Range    Ph 7.397 (L) 7.400 - 7.500    Pco2 33.1 26.0 - 37.0 mmHg    Po2 116 (H) 64 - 87 mmHg    Tco2 21 20 - 33 mmol/L    S02 99 93 - 99 %    Hco3 20.3 17.0 - 25.0 mmol/L    BE -4 -4 - 3 mmol/L    Body Temp 98.6 F degrees    O2 Therapy 30 %    iPF Ratio 387     Ph Temp Norman 7.397 (L) 7.400 - 7.500    Pco2 Temp Co 33.1 26.0 - 37.0 mmHg    Po2 Temp Cor 116 (H) 64 - 87 mmHg    Specimen Arterial     DelSys Vent     End Tidal Carbon Dioxide 34 mmhg    Tidal Volume 400 mL    Peep End Expiratory Pressure 8 cmh20     Set Rate 16     Mode APV-CMV    CBC with Differential    Collection Time: 21  9:35 PM   Result Value Ref Range    WBC 6.8 4.8 - 10.8 K/uL    RBC 4.68 4.20 - 5.40 M/uL    Hemoglobin 13.2 12.0 - 16.0 g/dL    Hematocrit 41.4 37.0 - 47.0 %    MCV 88.5 81.4 - 97.8 fL    MCH 28.2 27.0 - 33.0 pg    MCHC 31.9 (L) 33.6 - 35.0 g/dL    RDW 43.8 35.9 - 50.0 fL    Platelet Count 183 164 - 446 K/uL    MPV 10.8 9.0 - 12.9 fL    Neutrophils-Polys 93.90 (H) 44.00 - 72.00 %    Lymphocytes 4.70 (L) 22.00 - 41.00 %    Monocytes 0.40 0.00 - 13.40 %    Eosinophils 0.10 0.00 - 6.90 %    Basophils 0.30 0.00 - 1.80 %    Immature Granulocytes 0.60 0.00 - 0.90 %    Nucleated RBC 0.00 /100 WBC    Neutrophils (Absolute) 6.33 2.00 - 7.15 K/uL    Lymphs (Absolute) 0.32 (L) 1.00 - 4.80 K/uL    Monos (Absolute) 0.03 0.00 - 0.85 K/uL    Eos (Absolute) 0.01 0.00 - 0.51 K/uL    Baso (Absolute) 0.02 0.00 - 0.12 K/uL    Immature Granulocytes (abs) 0.04 0.00 - 0.11 K/uL    NRBC (Absolute) 0.00 K/uL   HCG QUAL SERUM    Collection Time: 21  9:35 PM   Result Value Ref Range    Beta-Hcg Qualitative Serum Negative Negative   EKG    Collection Time: 21  9:46 PM   Result Value Ref Range    Report       [facility]    Test Date:  2021  Pt Name:    AMMON AMAYA              Department: 161  MRN:        [id number]                      Room:       [facility]  Gender:     Female                       Technician: MONICA  :        [id number]                   Requested By:DREW COLE  Order #:    [id number]                    Reading MD:    Measurements  Intervals                                Axis  Rate:       77                           P:          51  MA:         105                          QRS:        75  QRSD:       88                           T:          60  QT:         384  QTc:        435    Interpretive Statements  SINUS RHYTHM  SHORT MA INTERVAL  LOW VOLTAGE, PRECORDIAL LEADS  BASELINE WANDER IN LEAD(S) V3  No previous ECG  available for comparison         Imaging  DX-CHEST-PORTABLE (1 VIEW)   Final Result      No acute cardiopulmonary abnormality.      Satisfactory appearance of endotracheal tube.      DX-CHEST-PORTABLE (1 VIEW)    (Results Pending)       Assessment/Plan  * Anaphylactic reaction  Assessment & Plan  History of dairy allergy and apparently she ate a yogurt covered rectal with immediate anaphylactic reaction and upper airway obstruction, rash  Treated with epinephrine, fluid resuscitation, Solu-Medrol, Benadryl, Pepcid  Rash nearly resolved with good air entry bilaterally  Currently normotensive, will continue IV fluid, further epinephrine if hypotensive but she is likely past the acute phase at this point  She will need instructions regarding anaphylaxis, EpiPen, etc. prior to discharge    Respiratory failure requiring intubation (HCC)  Assessment & Plan  Emergently intubated for anaphylaxis with upper airway obstruction  Continue full ventilator support, A-F bundle  SAT/SBT in a.m., assess for cuff leak, extubate as clinically appropriate      Discussed patient condition and risk of morbidity and/or mortality with Hospitalist, RN, RT and Pharmacy.    The patient remains critically ill.  Critical care time = 45 minutes in directly providing and coordinating critical care and extensive data review.  No time overlap and excludes procedures.

## 2021-04-21 NOTE — ASSESSMENT & PLAN NOTE
Emergently intubated for anaphylaxis with upper airway obstruction  Continue full ventilator support, A-F bundle  SAT/SBT in a.m., assess for cuff leak, extubate as clinically appropriate

## 2021-04-21 NOTE — ASSESSMENT & PLAN NOTE
History of dairy allergy and apparently she ate a yogurt covered rectal with immediate anaphylactic reaction and upper airway obstruction, rash  Treated with epinephrine, fluid resuscitation, Solu-Medrol, Benadryl, Pepcid  Rash nearly resolved with good air entry bilaterally  Currently normotensive, will continue IV fluid, further epinephrine if hypotensive but she is likely past the acute phase at this point  She will need instructions regarding anaphylaxis, EpiPen, etc. prior to discharge  If cuff leak present ok to extubate

## 2021-04-21 NOTE — RESPIRATORY CARE
Extubation    Cuff leak noted yes  Stridor present no     FiO2%: 30 % (04/21/21 0623)        Patient toleration well  RCP Complete? Will assess in 1 hour  Events/Summary/Plan: Pt extubated per Dr. Nunes tolerated well no signs of distress  (04/21/21 0930)

## 2021-04-21 NOTE — PROGRESS NOTES
[facility] TRIAGE OFFICER DIRECT ADMISSION REPORT  Transferring facility: [facility]  Transferring physician:  Not given  Transferring facility/physician contact number: Not given  Chief complaint: Anaphylaxis with respiratory distress  Pertinent history & patient course: This is a 35-year-old female who apparently was at [facility] in [facility] with some friends.  She reportedly has a dairy allergy and ate a yogurt covered pretzel.  She immediately began having anaphylactic symptoms with diffuse red rash and respiratory distress.  On EMS arrival they found her in marked respiratory distress with minimal air movement.  She was given oxygen and I believe some bag mask assist to the ED at [facility].  There she was immediately given IV and IM epinephrine in the ambulance bay and then brought into the ER room and intubated.  ERP describes significantly edematous vocal folds nearly completely swollen shut but was able to be intubated on the first attempt.  She had some mild inferior ST depression with no elevation.  She was given IV Solu-Medrol, Benadryl and further work-up for Covid and labs were pending at the time of call.  She was not hypotensive and was not requiring any further epinephrine.  Diffuse bright red rash was improving at the time of transfer call.  EMS did not have any [place]  They are attempting to get more contact information.  Pertinent imaging & lab results: Pending  Code Status: Full code per transferring provider, I personally verified with the transferring provider patient's code status and the transferring provider has confirmed this with the patient.  Further work up or recommendations per triage officer prior to transfer: Adequate peripheral IV access, epinephrine if further hypotension  Consultants called prior to transfer and pertinent input from consultants: Critical care  Patient accepted for transfer:  Yes  Consultants to be called upon arrival: Critical care, hospitalist for admission  Admission status: Inpatient.   Floor requested: ICU  If ICU transfer, name of intensivist case discussed with and pertinent input from critical care: Dr. Marley    Please inform the triage officer upon arrival of the patient to [facility] for assignment of a hospitalist to perform admission.     For any question or concerns regarding the care of this patient, please reach out to the assigned hospitalist.
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
## 2021-04-21 NOTE — FLOWSHEET NOTE
04/21/21 0727   Events/Summary/Plan   Events/Summary/Plan SBT end asv to 100%   General Vent Information   Vent Mode ASV   Vent Settings   PEEP/CPAP 8   Pramp 50   ETS(%) 25   (ASV) % MIN    Target MV 5.5   Spontaneous Breathing Trial (SBT)   Spontaneous breathing trial (SBT) outcome Pt weaned for 1 hour and returned to rest settings per protocol - SBT Pass   Length of Weaning Trial (Hours) 1   Weaning Parameters   RR (bpm) 10   $ FVC / Vital Capacity (liters)  3.7   NIF (cm H2O)  -30   Rapid Shallow Breathing Index (RR/VT) 16   Spontaneous VE 9.3   Spontaneous

## 2021-04-22 VITALS
OXYGEN SATURATION: 96 % | BODY MASS INDEX: 24.99 KG/M2 | RESPIRATION RATE: 14 BRPM | SYSTOLIC BLOOD PRESSURE: 96 MMHG | HEART RATE: 82 BPM | TEMPERATURE: 98.6 F | DIASTOLIC BLOOD PRESSURE: 62 MMHG | HEIGHT: 64 IN | WEIGHT: 146.39 LBS

## 2021-04-22 PROBLEM — T78.2XXA ANAPHYLACTIC REACTION: Status: RESOLVED | Noted: 2021-04-20 | Resolved: 2021-04-22

## 2021-04-22 PROBLEM — J96.90 RESPIRATORY FAILURE REQUIRING INTUBATION (HCC): Status: RESOLVED | Noted: 2021-04-20 | Resolved: 2021-04-22

## 2021-04-22 LAB
ANION GAP SERPL CALC-SCNC: 6 MMOL/L (ref 7–16)
BASOPHILS # BLD AUTO: 0.2 % (ref 0–1.8)
BASOPHILS # BLD: 0.02 K/UL (ref 0–0.12)
BUN SERPL-MCNC: 9 MG/DL (ref 8–22)
CALCIUM SERPL-MCNC: 8.5 MG/DL (ref 8.5–10.5)
CHLORIDE SERPL-SCNC: 107 MMOL/L (ref 96–112)
CO2 SERPL-SCNC: 25 MMOL/L (ref 20–33)
CREAT SERPL-MCNC: 0.47 MG/DL (ref 0.5–1.4)
EOSINOPHIL # BLD AUTO: 0 K/UL (ref 0–0.51)
EOSINOPHIL NFR BLD: 0 % (ref 0–6.9)
ERYTHROCYTE [DISTWIDTH] IN BLOOD BY AUTOMATED COUNT: 45.2 FL (ref 35.9–50)
GLUCOSE BLD-MCNC: 123 MG/DL (ref 65–99)
GLUCOSE BLD-MCNC: 81 MG/DL (ref 65–99)
GLUCOSE BLD-MCNC: 89 MG/DL (ref 65–99)
GLUCOSE SERPL-MCNC: 91 MG/DL (ref 65–99)
HCT VFR BLD AUTO: 38.4 % (ref 37–47)
HGB BLD-MCNC: 12.2 G/DL (ref 12–16)
IMM GRANULOCYTES # BLD AUTO: 0.04 K/UL (ref 0–0.11)
IMM GRANULOCYTES NFR BLD AUTO: 0.4 % (ref 0–0.9)
LYMPHOCYTES # BLD AUTO: 1.66 K/UL (ref 1–4.8)
LYMPHOCYTES NFR BLD: 18 % (ref 22–41)
MAGNESIUM SERPL-MCNC: 2.2 MG/DL (ref 1.5–2.5)
MCH RBC QN AUTO: 28.2 PG (ref 27–33)
MCHC RBC AUTO-ENTMCNC: 31.8 G/DL (ref 33.6–35)
MCV RBC AUTO: 88.9 FL (ref 81.4–97.8)
MONOCYTES # BLD AUTO: 0.51 K/UL (ref 0–0.85)
MONOCYTES NFR BLD AUTO: 5.5 % (ref 0–13.4)
NEUTROPHILS # BLD AUTO: 6.98 K/UL (ref 2–7.15)
NEUTROPHILS NFR BLD: 75.9 % (ref 44–72)
NRBC # BLD AUTO: 0 K/UL
NRBC BLD-RTO: 0 /100 WBC
PLATELET # BLD AUTO: 190 K/UL (ref 164–446)
PMV BLD AUTO: 11 FL (ref 9–12.9)
POTASSIUM SERPL-SCNC: 4.3 MMOL/L (ref 3.6–5.5)
RBC # BLD AUTO: 4.32 M/UL (ref 4.2–5.4)
SODIUM SERPL-SCNC: 138 MMOL/L (ref 135–145)
WBC # BLD AUTO: 9.2 K/UL (ref 4.8–10.8)

## 2021-04-22 PROCEDURE — A9270 NON-COVERED ITEM OR SERVICE: HCPCS | Performed by: INTERNAL MEDICINE

## 2021-04-22 PROCEDURE — 99238 HOSP IP/OBS DSCHRG MGMT 30/<: CPT | Performed by: HOSPITALIST

## 2021-04-22 PROCEDURE — 700111 HCHG RX REV CODE 636 W/ 250 OVERRIDE (IP): Performed by: INTERNAL MEDICINE

## 2021-04-22 PROCEDURE — 82962 GLUCOSE BLOOD TEST: CPT

## 2021-04-22 PROCEDURE — 83735 ASSAY OF MAGNESIUM: CPT

## 2021-04-22 PROCEDURE — 700102 HCHG RX REV CODE 250 W/ 637 OVERRIDE(OP): Performed by: INTERNAL MEDICINE

## 2021-04-22 PROCEDURE — 80048 BASIC METABOLIC PNL TOTAL CA: CPT

## 2021-04-22 PROCEDURE — 85025 COMPLETE CBC W/AUTO DIFF WBC: CPT

## 2021-04-22 RX ADMIN — OXYCODONE HYDROCHLORIDE 10 MG: 5 TABLET ORAL at 06:31

## 2021-04-22 RX ADMIN — ALPRAZOLAM 0.5 MG: 0.5 TABLET ORAL at 08:43

## 2021-04-22 RX ADMIN — DOCUSATE SODIUM 50 MG AND SENNOSIDES 8.6 MG 2 TABLET: 8.6; 5 TABLET, FILM COATED ORAL at 06:12

## 2021-04-22 RX ADMIN — OXYCODONE HYDROCHLORIDE 10 MG: 5 TABLET ORAL at 15:07

## 2021-04-22 RX ADMIN — ENOXAPARIN SODIUM 40 MG: 40 INJECTION SUBCUTANEOUS at 06:11

## 2021-04-22 ASSESSMENT — PAIN DESCRIPTION - PAIN TYPE
TYPE: ACUTE PAIN
TYPE: ACUTE PAIN;CHRONIC PAIN
TYPE: ACUTE PAIN
TYPE: ACUTE PAIN;CHRONIC PAIN
TYPE: ACUTE PAIN
TYPE: ACUTE PAIN;CHRONIC PAIN

## 2021-04-22 NOTE — PROGRESS NOTES
Patient expressed she is having constant, aching pain secondary to endometriosis, polycystic ovarian syndrome. Pain unrelieved by oxycodone 10 mg.   MD Martins paged. Orders for a one time dose of 0.5 mg Dilaudid received. Will continue to monitor.

## 2021-04-22 NOTE — CARE PLAN
Problem: Communication  Goal: The ability to communicate needs accurately and effectively will improve  Outcome: PROGRESSING AS EXPECTED     Problem: Infection  Goal: Will remain free from infection  Outcome: PROGRESSING AS EXPECTED     Problem: Venous Thromboembolism (VTW)/Deep Vein Thrombosis (DVT) Prevention:  Goal: Patient will participate in Venous Thrombosis (VTE)/Deep Vein Thrombosis (DVT)Prevention Measures  Outcome: PROGRESSING AS EXPECTED     Problem: Bowel/Gastric:  Goal: Normal bowel function is maintained or improved  Outcome: PROGRESSING AS EXPECTED  Goal: Will not experience complications related to bowel motility  Outcome: PROGRESSING AS EXPECTED     Problem: Knowledge Deficit  Goal: Knowledge of disease process/condition, treatment plan, diagnostic tests, and medications will improve  Outcome: PROGRESSING AS EXPECTED  Goal: Knowledge of the prescribed therapeutic regimen will improve  Outcome: PROGRESSING AS EXPECTED     Problem: Discharge Barriers/Planning  Goal: Patient's continuum of care needs will be met  Outcome: PROGRESSING AS EXPECTED     Problem: Pain Management  Goal: Pain level will decrease to patient's comfort goal  Outcome: PROGRESSING AS EXPECTED     Problem: Respiratory:  Goal: Respiratory status will improve  Outcome: PROGRESSING AS EXPECTED     Problem: Fluid Volume:  Goal: Will maintain balanced intake and output  Outcome: PROGRESSING AS EXPECTED     Problem: Psychosocial Needs:  Goal: Level of anxiety will decrease  Outcome: PROGRESSING AS EXPECTED     Problem: Skin Integrity  Goal: Risk for impaired skin integrity will decrease  Outcome: PROGRESSING AS EXPECTED

## 2021-04-22 NOTE — CARE PLAN
Problem: Pain Management  Goal: Pain level will decrease to patient's comfort goal  Outcome: PROGRESSING SLOWER THAN EXPECTED  Flowsheets  Taken 4/22/2021 0200 by Angel Mosqueda, Student  Pain Rating Scale (NPRS): 3 (Pended)  Taken 4/22/2021 0000 by Angel Mosqueda Student  Comfort Goal: 0 (Pended)  Taken 4/21/2021 2300 by Greer Cornelius R.N.  Non Verbal Scale: Crying  Taken 4/21/2021 0700 by Greer Cornelius R.N.  Critical-Care Pain Observation Score: 2  Note: Managing pain with q4h oxycodone and a one time prn dose of dilaudid.      Problem: Safety  Goal: Will remain free from injury  Outcome: MET  Note: Patient SBA to bathroom with nursing staff present.   Goal: Will remain free from falls  Outcome: MET

## 2021-04-22 NOTE — DISCHARGE PLANNING
Care Transition Team Discharge Planning    Anticipated Discharge Disposition: d/c home    Action: Lsw spoke to Bs RN. She indicates pt lives in [place], [place]. Pt's friend can p/u but needed a heads up about the d/c order.     Unfortunately, the d/c order was just placed, and friend cannot come until she gets gas money at 2 AM tonight.    Lsw explained pt can go to local shelter. We are not able to pay for her to transport back to her home in [place].    Barriers to Discharge: TBD    Plan: Lsw will continue to follow, and assist w/ d/c planning when able.

## 2021-04-22 NOTE — PROGRESS NOTES
Assumed care of pt. Pt A/O able to make needs known. Lungs clear O2 sat 96%. MAEE. ABS x4 quads. No c/o pain or burning with urination. Ice pack to lower abd due to abd pain from endometriosis. HR SR 80s.

## 2021-04-22 NOTE — DISCHARGE SUMMARY
Discharge Summary    CHIEF COMPLAINT ON ADMISSION  No chief complaint on file.      Reason for Admission  Anaphylactic shock     Admission Date  4/20/2021    CODE STATUS  Full Code    HPI & HOSPITAL COURSE  This is a 35 y.o. female here with anaphylaxis from food allergy after eating a yogurt covered pretzel.  She was camping at [facility] and was taken to [facility] in [facility] given epinephrine, intubated, and given IV steroid and benadryl.  She was extubated 4/21 0930 and tolerated off mechanical ventilator.  She was eating and swallow without issue.        Therefore, she is discharged in good and stable condition to home with close outpatient follow-up.    The patient met 2-midnight criteria for an inpatient stay at the time of discharge.    Discharge Date  4/22/2021    FOLLOW UP ITEMS POST DISCHARGE  None    DISCHARGE DIAGNOSES  Principal Problem (Resolved):    Anaphylactic reaction POA: Unknown  Active Problems:    * No active hospital problems. *  Resolved Problems:    Respiratory failure requiring intubation (HCC) POA: Unknown      FOLLOW UP  No future appointments.  Your primary care provider    Schedule an appointment as soon as possible for a visit in 1 month  As needed      MEDICATIONS ON DISCHARGE     Medication List      You have not been prescribed any medications.         Allergies  Allergies   Allergen Reactions   • Dairy Food Allergy Anaphylaxis   • Ceftriaxone      Rash   • Chloral Hydrate    • Contrast Media With Iodine [Iodine]    • Doxycycline      rash   • Ketamine    • Lactose    • Morphine      Hives     • Prednisone    • Venomil Honey Bee Anaphylaxis       DIET  Orders Placed This Encounter   Procedures   • Diet Order Diet: Regular     Standing Status:   Standing     Number of Occurrences:   1     Order Specific Question:   Diet:     Answer:   Regular [1]       ACTIVITY  As tolerated.  Weight bearing as  tolerated    CONSULTATIONS  Intensivist    PROCEDURES  None    LABORATORY  Lab Results   Component Value Date    SODIUM 138 04/22/2021    POTASSIUM 4.3 04/22/2021    CHLORIDE 107 04/22/2021    CO2 25 04/22/2021    GLUCOSE 91 04/22/2021    BUN 9 04/22/2021    CREATININE 0.47 (L) 04/22/2021        Lab Results   Component Value Date    WBC 9.2 04/22/2021    HEMOGLOBIN 12.2 04/22/2021    HEMATOCRIT 38.4 04/22/2021    PLATELETCT 190 04/22/2021        Total time of the discharge process exceeds 25 minutes.

## 2021-04-23 NOTE — DISCHARGE INSTRUCTIONS
Discharge Instructions per Omid Linares D.O.    DIET: Healthy balanced diet    ACTIVITY: As tolerates    DIAGNOSIS: acute respiratory failure requiring temporary mechanical ventilation due to Anaphylaxis from food    Return to ER if recurrence of symptoms.    Discharge Instructions    Discharged to home by car with friend. Discharged via walking, hospital escort: Yes.  Special equipment needed: Not Applicable    Be sure to schedule a follow-up appointment with your primary care doctor or any specialists as instructed.     Discharge Plan:        I understand that a diet low in cholesterol, fat, and sodium is recommended for good health. Unless I have been given specific instructions below for another diet, I accept this instruction as my diet prescription.   Other diet: See Vijay note    Special Instructions: None    · Is patient discharged on Warfarin / Coumadin?   No     Depression / Suicide Risk    As you are discharged from this [facility] facility, it is important to learn how to keep safe from harming yourself.    Recognize the warning signs:  · Abrupt changes in personality, positive or negative- including increase in energy   · Giving away possessions  · Change in eating patterns- significant weight changes-  positive or negative  · Change in sleeping patterns- unable to sleep or sleeping all the time   · Unwillingness or inability to communicate  · Depression  · Unusual sadness, discouragement and loneliness  · Talk of wanting to die  · Neglect of personal appearance   · Rebelliousness- reckless behavior  · Withdrawal from people/activities they love  · Confusion- inability to concentrate     If you or a loved one observes any of these behaviors or has concerns about self-harm, here's what you can do:  · Talk about it- your feelings and reasons for harming yourself  · Remove any means that you might use to hurt yourself (examples: pills, rope, extension cords, firearm)  · Get professional help from  the community (Mental Health, Substance Abuse, psychological counseling)  · Do not be alone:Call your Safe Contact- someone whom you trust who will be there for you.  · Call your local CRISIS HOTLINE 083-2872 or 556-109-2546  · Call your local Children's Mobile Crisis Response Team [place] (451) 395-3135 or www.mobiManage  · Call the toll free National Suicide Prevention Hotlines   · National Suicide Prevention Lifeline 141-289-TPBG (2240)  · National Hope Line Network 800-SUICIDE (140-3847)

## 2021-04-23 NOTE — PROGRESS NOTES
Patient given discharge instructions, belongings gathered, and escorted downstairs where friend picked up.

## 2021-04-26 ENCOUNTER — HOSPITAL ENCOUNTER (OUTPATIENT)
Dept: RADIOLOGY | Facility: MEDICAL CENTER | Age: 36
End: 2021-04-26
Payer: COMMERCIAL